# Patient Record
Sex: FEMALE | Race: WHITE
[De-identification: names, ages, dates, MRNs, and addresses within clinical notes are randomized per-mention and may not be internally consistent; named-entity substitution may affect disease eponyms.]

---

## 2017-03-22 ENCOUNTER — HOSPITAL ENCOUNTER (OUTPATIENT)
Dept: HOSPITAL 63 - MAMMO | Age: 52
Discharge: HOME | End: 2017-03-22
Attending: PHYSICIAN ASSISTANT
Payer: COMMERCIAL

## 2017-03-22 DIAGNOSIS — Z01.419: ICD-10-CM

## 2017-03-22 DIAGNOSIS — Z12.31: Primary | ICD-10-CM

## 2017-04-05 ENCOUNTER — HOSPITAL ENCOUNTER (OUTPATIENT)
Dept: HOSPITAL 63 - MAMMO | Age: 52
Discharge: HOME | End: 2017-04-05
Attending: PHYSICIAN ASSISTANT
Payer: COMMERCIAL

## 2017-04-05 DIAGNOSIS — R92.1: ICD-10-CM

## 2017-04-05 DIAGNOSIS — R92.8: Primary | ICD-10-CM

## 2017-04-05 PROCEDURE — 76641 ULTRASOUND BREAST COMPLETE: CPT

## 2017-04-05 NOTE — RAD
EXAM: BREAST RIGHT, DIGITAL DIAGNOSTIC RT.



HISTORY: Abnormal screening mammogram.



COMPARISON: Bilateral mammogram 3/22/2017 and 7/23/2014.



FINDINGS:



Digital mammography was performed. Computer-aided detection (CAD) was

utilized.  Spot compression magnification views of the right breast were

obtained in the CC and MLO projections. A true lateral view of the right

breast was also obtained.



The spot compression view demonstrates questionable persistence of a small

asymmetry, although the asymmetry on MLO view is not convincingly persistent

on the spot compression view. No convincing asymmetry is identified on the

true lateral view. A few scattered calcifications are seen in the left breast

which do not appear to be associated with a mammographic mass. The

calcifications do not appear sufficiently clustered or suspicious.



To exclude presence of mass in the retroareolar region, right breast

ultrasound was performed. Retroareolar imaging was performed by ultrasound

technologist. No solid or cystic masses are identified.



IMPRESSION: 

No mammographic or sonographic evidence of retroareolar mass. Recommend return

to annual screening mammography.







BI-RADS CATEGORY: 2 BENIGN FINDING(S)



RECOMMENDED FOLLOW-UP: 12M 12 MONTH FOLLOW-UP



PQRS compliance statement: Patient information was entered into a reminder

system with a target due date     for the next mammogram.



Mammography is a sensitive method for finding small breast cancers, but it

does not detect them all and is not a substitute for careful clinical

examination.  A negative mammogram does not negate a clinically suspicious

finding and should not result in delay in biopsying a clinically suspicious

abnormality.



"Our facility is accredited by the [employer] Mammography

Program."

## 2018-04-11 ENCOUNTER — HOSPITAL ENCOUNTER (OUTPATIENT)
Dept: HOSPITAL 63 - MAMMO | Age: 53
Discharge: HOME | End: 2018-04-11
Attending: PHYSICIAN ASSISTANT
Payer: COMMERCIAL

## 2018-04-11 DIAGNOSIS — Z12.31: Primary | ICD-10-CM

## 2018-04-11 PROCEDURE — 77067 SCR MAMMO BI INCL CAD: CPT

## 2018-04-11 PROCEDURE — 77063 BREAST TOMOSYNTHESIS BI: CPT

## 2018-04-11 NOTE — RAD
DATE: 4/11/2018   



EXAM: MAMMO SADIE SCREENING BILATERAL



HISTORY: Routine screening   



COMPARISON: 3/22/2017   



This study was interpreted with the benefit of Computerized Aided Detection

(CAD).



The breast parenchyma is heterogeneously dense, which could reduce sensitivity

of mammography. Breast parenchyma level C.





FINDINGS: 2-D and 3-D tomosynthesis imaging was performed in CC and MLO

projections.  The fibroglandular densities in both breasts are somewhat

nodular in nature.  A 9 mm smooth nodule is noted centrally in the left

breast, best seen on oblique sadie image #29.  It appears to have been present

in retrospect on the oblique view of the 7/23/2014 exam, although better

defined currently due to the 3-D technique.  No spiculated mass or

architectural distortion is evident.  A few scattered microcalcifications are

again noted.  No malignant type microcalcifications are evident.





IMPRESSION: There is no mammographic evidence of malignancy in either breast. 

 



BI-RADS CATEGORY: 2 BENIGN FINDING(S)



RECOMMENDED FOLLOW-UP: 12M 12 MONTH FOLLOW-UP



PQRS compliance statement: Patient information was entered into a reminder

system with a target due date     for the next mammogram.



Mammography is a sensitive method for finding small breast cancers, but it

does not detect them all and is not a substitute for careful clinical

examination.  A negative mammogram does not negate a clinically suspicious

finding and should not result in delay in biopsying a clinically suspicious

abnormality.



"Our facility is accredited by the [employer] Mammography

Program."

## 2021-08-20 ENCOUNTER — HOSPITAL ENCOUNTER (EMERGENCY)
Dept: HOSPITAL 63 - ER | Age: 56
Discharge: HOME | End: 2021-08-20
Payer: COMMERCIAL

## 2021-08-20 VITALS
SYSTOLIC BLOOD PRESSURE: 148 MMHG | BODY MASS INDEX: 32.98 KG/M2 | HEIGHT: 70 IN | WEIGHT: 230.38 LBS | DIASTOLIC BLOOD PRESSURE: 80 MMHG
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
## 2021-08-20 DIAGNOSIS — S83.004A: Primary | ICD-10-CM

## 2021-08-20 PROCEDURE — 73562 X-RAY EXAM OF KNEE 3: CPT

## 2021-08-20 PROCEDURE — 27562 TREAT KNEECAP DISLOCATION: CPT

## 2021-08-20 NOTE — PHYS DOC
General Adult


EDM:


Chief Complaint:  knee pain





HPI:


HPI:


56-year-old female presents via EMS for concern of dislocation of her right 

knee.  The patient has bilateral knee replacements.  She has had dislocations in

the past.  She was simply taking a step sideways when she felt her right knee go

out of joint.  There was a chair right there so she sat down.  EMS padded the 

knee and brought her to the emergency room.  The patient does not have any 

altered sensation, tingling, or numbness.  She denies any other injuries or 

complaints at this time.





Review of Systems:


Review of Systems:


Constitutional:  Denies fever or chills 


Eyes:  Denies change in visual acuity 


HENT:  Denies nasal congestion or sore throat 


Respiratory:  Denies cough or shortness of breath 


Cardiovascular:  Denies chest pain or edema 


GI:  Denies abdominal pain, nausea, vomiting, bloody stools or diarrhea 


: Denies dysuria 


Musculoskeletal: Right knee pain


Integument:  Denies rash 


Neurologic:  Denies headache, focal weakness or sensory changes 


Endocrine:  Denies polyuria or polydipsia 


Lymphatic:  Denies swollen glands 


Psychiatric:  Denies depression or anxiety





Physical Exam:


PE:





Constitutional: Well developed, well nourished, obese, no acute distress, non-

toxic appearance. []


HENT: Normocephalic, atraumatic, bilateral external ears normal, oropharynx 

moist, no oral exudates, nose normal. []


Eyes: PERRLA, EOMI, conjunctiva normal, no discharge. [] 


Neck: Normal range of motion, no tenderness, supple, no stridor. [] 


Cardiovascular: Heart rate regular rhythm, no murmur []


Lungs & Thorax:  Bilateral breath sounds clear to auscultation []


Abdomen: Bowel sounds normal, soft, no tenderness, no masses, no pulsatile 

masses. [] 


Skin: Warm, dry, no erythema, no rash. [] 


Back: No tenderness, no CVA tenderness. [] 


Extremities: Right knee with inferior prominence anteriorly and medially.  [] 


Neurologic: Alert and oriented X 3, normal motor function, normal sensory 

function, no focal deficits noted. []


Psychologic: Affect normal, judgement normal, mood normal. []





EKG:


EKG:


[]





Radiology/Procedures:


Radiology/Procedures:


[]





Heart Score:


C/O Chest Pain:  N/A


Risk Factors:


Risk Factors:  DM, Current or recent (<one month) smoker, HTN, HLP, family 

history of CAD, obesity.


Risk Scores:


Score 0 - 3:  2.5% MACE over next 6 weeks - Discharge Home


Score 4 - 6:  20.3% MACE over next 6 weeks - Admit for Clinical Observation


Score 7 - 10:  72.7% MACE over next 6 weeks - Early Invasive Strategies





Course & Med Decision Making:


Course & Med Decision Making


Pertinent Labs and Imaging studies reviewed. (See chart for details)


Initial interpretation of the x-rays show the artificial joint to be in place.  

Careful examination showed is very hard to see the patella.  This appears to be 

a patellar dislocation.  The student paramedic and I reduced the patient's 

patella dislocation.  See note below for more details.  The patient, much better

 since his procedure was complete.  She was placed in a brace.  She is stable 

for discharge at this time





Patellar reduction procedure:


I obtained verbal consent from the patient for reduction of her dislocated 

patella.  No anesthesia was used as the patient declined.  I straighten out her 

leg as the paramedics did not provide at the light lateral force on the patella.

  Just as we reached full extension, the patella spontaneously reduced back into

 position.  Patient had immediate relief.  A brace was applied.  There were no 

complications.  Patient is neurovascular intact post procedure.





[]





Dragon Disclaimer:


Dragon Disclaimer:


This electronic medical record was generated, in whole or in part, using a voice

 recognition dictation system.





Departure


Departure:


Impression:  


   Primary Impression:  


   Dislocation of patella, right, closed


   Qualified Codes:  S83.004A - Unspecified dislocation of right patella, 

   initial encounter


Disposition:  01 HOME / SELF CARE / HOMELESS


Condition:  IMPROVED


Referrals:  


VENU JONES (PCP)


Patient Instructions:  Patellar Dislocation and Subluxation with Phase I 

[facility]











SUSAN BEAL DO                 Aug 20, 2021 15:27

## 2021-08-20 NOTE — RAD
Exam: Right knee 3 views



INDICATION: Dislocation, artificial knee



TECHNIQUE: Frontal, lateral and oblique views of the right knee



Comparisons: None



FINDINGS:

There is a large suprapatellar effusion. Right knee arthroplasty changes are noted. There is anterior
 dislocation of the tibia/polyethylene joint spaces are at the right knee joint relative to the femur
. No acute fracture.



IMPRESSION:

Anterior dislocation of the tibia/polyethylene joint spaces are at the right knee joint.



Electronically signed by: Jennifer Butts MD (8/20/2021 4:26 PM) [facility]